# Patient Record
Sex: MALE | Race: WHITE | URBAN - METROPOLITAN AREA
[De-identification: names, ages, dates, MRNs, and addresses within clinical notes are randomized per-mention and may not be internally consistent; named-entity substitution may affect disease eponyms.]

---

## 2024-03-28 ENCOUNTER — LAB (OUTPATIENT)
Dept: URBAN - METROPOLITAN AREA CLINIC 74 | Facility: CLINIC | Age: 44
End: 2024-03-28

## 2024-03-28 ENCOUNTER — OV NP (OUTPATIENT)
Dept: URBAN - METROPOLITAN AREA CLINIC 74 | Facility: CLINIC | Age: 44
End: 2024-03-28
Payer: COMMERCIAL

## 2024-03-28 VITALS
BODY MASS INDEX: 29.9 KG/M2 | WEIGHT: 213.6 LBS | DIASTOLIC BLOOD PRESSURE: 98 MMHG | SYSTOLIC BLOOD PRESSURE: 140 MMHG | HEIGHT: 71 IN | TEMPERATURE: 97.5 F | HEART RATE: 96 BPM

## 2024-03-28 DIAGNOSIS — D75.1 SECONDARY POLYCYTHEMIA: ICD-10-CM

## 2024-03-28 DIAGNOSIS — R10.821 RIGHT UPPER QUADRANT ABDOMINAL TENDERNESS WITH REBOUND TENDERNESS: ICD-10-CM

## 2024-03-28 DIAGNOSIS — Z79.1 LONG TERM CURRENT USE OF NON-STEROIDAL ANTI-INFLAMMATORIES (NSAID): ICD-10-CM

## 2024-03-28 DIAGNOSIS — R10.13 EPIGASTRIC ABDOMINAL PAIN: ICD-10-CM

## 2024-03-28 DIAGNOSIS — R11.0 NAUSEA: ICD-10-CM

## 2024-03-28 DIAGNOSIS — R10.826 EPIGASTRIC ABDOMINAL TENDERNESS WITH REBOUND TENDERNESS: ICD-10-CM

## 2024-03-28 PROCEDURE — 99204 OFFICE O/P NEW MOD 45 MIN: CPT | Performed by: PHYSICIAN ASSISTANT

## 2024-03-28 RX ORDER — TOPIRAMATE 50 MG/1
1 TABLET TABLET, FILM COATED ORAL ONCE A DAY
Status: ACTIVE | COMMUNITY

## 2024-03-28 RX ORDER — PANTOPRAZOLE SODIUM 40 MG/1
1 TABLET TABLET, DELAYED RELEASE ORAL TWICE DAILY
Qty: 60 | Refills: 3 | OUTPATIENT
Start: 2024-03-28

## 2024-03-28 RX ORDER — DIHYDROERGOTAMINE MESYLATE 4 MG/ML
1 SPRAY IN EACH NOSTRIL MAY REPEAT DOSE AFTER 1 HOUR NO MORE THAN 3 DOSES PER WEEK AS NEEDED SPRAY, METERED NASAL ONCE A DAY
Status: ACTIVE | COMMUNITY

## 2024-03-28 NOTE — HPI-TODAY'S VISIT:
The patient is 44-year-old male with past medical history as noted below is presenting to our clinic today with epigastric abdominal pain. The patient with known history of secondary polycythemia follow with hematology Dr. Willis last was seen in their office on 01/30/2024.  Extended workup with hematocrit at 56.8% and hemoglobin 18.6.  The patient has had extended labs, which revealed elevated hematocrit on January 2021 of 55%, that increased to 60.8 % in March. The patient that JAK2 V617F cascading reflex panel was negative for any mutations. Hereditary Hemochromatosis mutation was also not detected. He has been feeling better since he had phlebotomy. Today he reports he has been  following with Pulmonology and has completed sleep study and started on CPAP for ALISON.   -- Polycythemia: suspected secondary to possible sleep apnea.  JAK2 V617F cascading reflex panel was negative for any mutations. Hereditary Hemochromatosis mutation was also not detected.  He underwent therapeutic phlebotomy with correction of his hematocrit from the highest of 56 most recently  to 44.  Repeat blood work today shows hematocrit of 47.5.  Will monitor CBC every 8 weeks perform phlebotomy as indicated.  -- Elevated iron/iron concentration: Hereditary hemochromatosis gene mutation not detected. Iron studies and ferritin have now normalized.   -- Sleep apnea:  He is now on CPAP and will continue to follow-up with pulmonology.   The patient is here today with epigastric abdominal pain shortly after he was on a course of Motrin for migraine headche. He also ahs bee treated twice with Antibiotic therapy Z-pack and Augmentin for infections. He does have dyspepsia b ut he does not taking any medications except occsional over the counter medications. He is now see Neurologist and he is on appropriate medication. He states that his abdoominal pain is associated with nause and increase pain after meals. No changes in his bowel habits.     -- The patient denies dyspepsia, dysphagia, odynophagia, hemoptysis, hematemesis, vomiting, regurgitation, melena, constipation, diarrhea, hematochezia, fever, chills, chest pain, SOB, or any other GI complaints today.   -- The patient denies ETOH (socially), Tobacco, and Illicit drug use.   -- The patient is not up to date with Flu and COVID vaccine.  -- Admits to take NSAID's daily.

## 2024-04-08 ENCOUNTER — EGD (OUTPATIENT)
Dept: URBAN - METROPOLITAN AREA SURGERY CENTER 30 | Facility: SURGERY CENTER | Age: 44
End: 2024-04-08
Payer: COMMERCIAL

## 2024-04-08 ENCOUNTER — LAB (OUTPATIENT)
Dept: URBAN - METROPOLITAN AREA CLINIC 4 | Facility: CLINIC | Age: 44
End: 2024-04-08
Payer: COMMERCIAL

## 2024-04-08 DIAGNOSIS — R10.13 ABDOMINAL DISCOMFORT, EPIGASTRIC: ICD-10-CM

## 2024-04-08 DIAGNOSIS — K21.9 ACID REFLUX: ICD-10-CM

## 2024-04-08 DIAGNOSIS — R11.0 NAUSEA: ICD-10-CM

## 2024-04-08 DIAGNOSIS — K31.89 OTHER DISEASES OF STOMACH AND DUODENUM: ICD-10-CM

## 2024-04-08 DIAGNOSIS — R10.11 ABDOMINAL BURNING SENSATION IN RIGHT UPPER QUADRANT: ICD-10-CM

## 2024-04-08 DIAGNOSIS — K21.9 GASTRO-ESOPHAGEAL REFLUX DISEASE WITHOUT ESOPHAGITIS: ICD-10-CM

## 2024-04-08 PROCEDURE — 88305 TISSUE EXAM BY PATHOLOGIST: CPT | Performed by: PATHOLOGY

## 2024-04-08 PROCEDURE — 88312 SPECIAL STAINS GROUP 1: CPT | Performed by: PATHOLOGY

## 2024-04-08 PROCEDURE — 43239 EGD BIOPSY SINGLE/MULTIPLE: CPT | Performed by: INTERNAL MEDICINE

## 2024-04-08 RX ORDER — PANTOPRAZOLE SODIUM 40 MG/1
1 TABLET TABLET, DELAYED RELEASE ORAL TWICE DAILY
Qty: 60 | Refills: 3 | Status: ACTIVE | COMMUNITY
Start: 2024-03-28

## 2024-04-08 RX ORDER — DIHYDROERGOTAMINE MESYLATE 4 MG/ML
1 SPRAY IN EACH NOSTRIL MAY REPEAT DOSE AFTER 1 HOUR NO MORE THAN 3 DOSES PER WEEK AS NEEDED SPRAY, METERED NASAL ONCE A DAY
Status: ACTIVE | COMMUNITY

## 2024-04-08 RX ORDER — TOPIRAMATE 50 MG/1
1 TABLET TABLET, FILM COATED ORAL ONCE A DAY
Status: ACTIVE | COMMUNITY

## 2024-04-24 ENCOUNTER — OV EP (OUTPATIENT)
Dept: URBAN - METROPOLITAN AREA CLINIC 74 | Facility: CLINIC | Age: 44
End: 2024-04-24
Payer: COMMERCIAL

## 2024-04-24 ENCOUNTER — LAB (OUTPATIENT)
Dept: URBAN - METROPOLITAN AREA CLINIC 74 | Facility: CLINIC | Age: 44
End: 2024-04-24

## 2024-04-24 VITALS
HEIGHT: 71 IN | BODY MASS INDEX: 30.24 KG/M2 | TEMPERATURE: 97.7 F | SYSTOLIC BLOOD PRESSURE: 124 MMHG | OXYGEN SATURATION: 98 % | HEART RATE: 95 BPM | WEIGHT: 216 LBS | DIASTOLIC BLOOD PRESSURE: 68 MMHG

## 2024-04-24 DIAGNOSIS — R10.12 LUQ ABDOMINAL PAIN: ICD-10-CM

## 2024-04-24 DIAGNOSIS — K29.30 CHRONIC SUPERFICIAL GASTRITIS WITHOUT BLEEDING: ICD-10-CM

## 2024-04-24 DIAGNOSIS — N20.0 NEPHROLITHIASIS: ICD-10-CM

## 2024-04-24 DIAGNOSIS — R07.89 COSTOCHONDRAL PAIN: ICD-10-CM

## 2024-04-24 DIAGNOSIS — D75.1 SECONDARY POLYCYTHEMIA: ICD-10-CM

## 2024-04-24 DIAGNOSIS — R10.9 LEFT FLANK PAIN: ICD-10-CM

## 2024-04-24 DIAGNOSIS — Z79.1 LONG TERM CURRENT USE OF NON-STEROIDAL ANTI-INFLAMMATORIES (NSAID): ICD-10-CM

## 2024-04-24 PROBLEM — 95570007: Status: ACTIVE | Noted: 2024-04-24

## 2024-04-24 PROBLEM — 196735001: Status: ACTIVE | Noted: 2024-04-24

## 2024-04-24 PROCEDURE — 99214 OFFICE O/P EST MOD 30 MIN: CPT | Performed by: PHYSICIAN ASSISTANT

## 2024-04-24 RX ORDER — TOPIRAMATE 50 MG/1
1 TABLET TABLET, FILM COATED ORAL ONCE A DAY
Status: ON HOLD | COMMUNITY

## 2024-04-24 RX ORDER — DICYCLOMINE HYDROCHLORIDE 20 MG/1
1 TABLET TABLET ORAL THREE TIMES A DAY
Qty: 90 | Refills: 1 | OUTPATIENT
Start: 2024-04-24 | End: 2024-06-23

## 2024-04-24 RX ORDER — PANTOPRAZOLE SODIUM 40 MG/1
1 TABLET TABLET, DELAYED RELEASE ORAL
Qty: 180 | Refills: 1
Start: 2024-04-08

## 2024-04-24 RX ORDER — PANTOPRAZOLE SODIUM 40 MG/1
1 TABLET TABLET, DELAYED RELEASE ORAL
Qty: 180 | Refills: 1 | Status: ON HOLD | COMMUNITY
Start: 2024-04-08

## 2024-04-24 RX ORDER — SUCRALFATE 1 G/1
1 TABLET ON AN EMPTY STOMACH TABLET ORAL TWICE A DAY
Qty: 60 | Refills: 1 | Status: ON HOLD | COMMUNITY
Start: 2024-04-08 | End: 2024-06-07

## 2024-04-24 RX ORDER — DIHYDROERGOTAMINE MESYLATE 4 MG/ML
1 SPRAY IN EACH NOSTRIL MAY REPEAT DOSE AFTER 1 HOUR NO MORE THAN 3 DOSES PER WEEK AS NEEDED SPRAY, METERED NASAL ONCE A DAY
Status: ON HOLD | COMMUNITY

## 2024-04-24 NOTE — PHYSICAL EXAM CHEST:
chest wall non-tender, breathing is unlabored without accessory muscle use, normal breath sounds, tender at left lower ribs

## 2024-04-24 NOTE — PHYSICAL EXAM GASTROINTESTINAL
Abdomen , soft, nontender, nondistended , no guarding or rigidity , no masses palpable , normal bowel sounds , Liver and Spleen,  no hepatosplenomegaly , liver nontender, CVA tenderness on the left side

## 2024-04-24 NOTE — HPI-TODAY'S VISIT:
The patient is 44-year-old male with past medical history as noted below is presenting to our clinic today to discuss his EGD and US results.  The patient is not taking Pantoprazole 40 mg and Sucralfate 1g. The patient is complaining of left upper back and lower back pain with no chnages in his urination or bowel movements. Increase pain with movement. Pain is associated with nausea. LUQ and left flank pain consistenet since last week. He has known history of kidney stone.    -- The patient with known history of secondary polycythemia follow with hematology Dr. Alba pascual was seen in their office on 01/30/2024.  Extended workup with hematocrit at 56.8% and hemoglobin 18.6.  The patient has had extended labs, which revealed elevated hematocrit on January 2021 of 55%, that increased to 60.8 % in March. The patient that JAK2 V617F cascading reflex panel was negative for any mutations. Hereditary Hemochromatosis mutation was also not detected. He has been feeling better since he had phlebotomy. Today he reports he has been  following with Pulmonology and has completed sleep study and started on CPAP for ALISON.   -- Polycythemia: suspected secondary to possible sleep apnea.  JAK2 V617F cascading reflex panel was negative for any mutations. Hereditary Hemochromatosis mutation was also not detected.  He underwent therapeutic phlebotomy with correction of his hematocrit from the highest of 56 most recently  to 44.  Repeat blood work today shows hematocrit of 47.5.  Will monitor CBC every 8 weeks perform phlebotomy as indicated.  -- Elevated Fe/Fe concentration: Hereditary hemochromatosis gene mutation not detected. Iron studies and ferritin have now normalized.   -- Sleep apnea:  He is now on CPAP and will continue to follow-up with pulmonary.     -- The patient denies dyspepsia, dysphagia, odynophagia, hemoptysis, hematemesis, vomiting, regurgitation, melena, constipation, diarrhea, hematochezia, fever, chills, chest pain, SOB, or any other GI complaints today.   -- The patient denies ETOH (socially), Tobacco, and Illicit drug use.   -- The patient is not up to date with Flu and COVID vaccine.  -- Admits to take NSAID's daily.   Diagnostic studies: -- RUQ US on 03/28/2024 with unremarakble results.   Procedure: -- EGD with biopsy on 0/08/2024 by Dr. Rivera noted Z-line variable, 39 cm from the incisors.  Gastritis.  No gross lesion entire examined duodenum.  Biopsy of small bowel with no significant abnormality.  Stomach with foveolar hyperplasia.  No H.pylori organisms.  No intestinal metaplasia.  Esophagus with reflux type changes.  No Hardwick's esophagus or eosinophilic esophagitis.

## 2024-05-06 ENCOUNTER — OFFICE VISIT (OUTPATIENT)
Dept: URBAN - METROPOLITAN AREA CLINIC 74 | Facility: CLINIC | Age: 44
End: 2024-05-06

## 2024-05-22 ENCOUNTER — OFFICE VISIT (OUTPATIENT)
Dept: URBAN - METROPOLITAN AREA CLINIC 74 | Facility: CLINIC | Age: 44
End: 2024-05-22

## 2024-10-22 ENCOUNTER — OFFICE VISIT (OUTPATIENT)
Dept: URBAN - METROPOLITAN AREA CLINIC 74 | Facility: CLINIC | Age: 44
End: 2024-10-22
Payer: COMMERCIAL

## 2024-10-22 ENCOUNTER — DASHBOARD ENCOUNTERS (OUTPATIENT)
Age: 44
End: 2024-10-22

## 2024-10-22 ENCOUNTER — LAB OUTSIDE AN ENCOUNTER (OUTPATIENT)
Dept: URBAN - METROPOLITAN AREA CLINIC 74 | Facility: CLINIC | Age: 44
End: 2024-10-22

## 2024-10-22 VITALS
HEIGHT: 71 IN | TEMPERATURE: 97.7 F | BODY MASS INDEX: 29.82 KG/M2 | WEIGHT: 213 LBS | HEART RATE: 82 BPM | SYSTOLIC BLOOD PRESSURE: 108 MMHG | OXYGEN SATURATION: 97 % | DIASTOLIC BLOOD PRESSURE: 74 MMHG

## 2024-10-22 DIAGNOSIS — M79.18 MUSCULOSKELETAL PAIN: ICD-10-CM

## 2024-10-22 DIAGNOSIS — K42.9 UMBILICAL HERNIA WITHOUT OBSTRUCTION AND WITHOUT GANGRENE: ICD-10-CM

## 2024-10-22 DIAGNOSIS — R19.4 CHANGE IN BOWEL HABITS: ICD-10-CM

## 2024-10-22 DIAGNOSIS — K29.30 CHRONIC SUPERFICIAL GASTRITIS WITHOUT BLEEDING: ICD-10-CM

## 2024-10-22 DIAGNOSIS — M94.0 COSTOCHONDRITIS: ICD-10-CM

## 2024-10-22 DIAGNOSIS — R10.30 LOWER ABDOMINAL PAIN: ICD-10-CM

## 2024-10-22 PROCEDURE — 99214 OFFICE O/P EST MOD 30 MIN: CPT | Performed by: PHYSICIAN ASSISTANT

## 2024-10-22 RX ORDER — PANTOPRAZOLE SODIUM 40 MG/1
1 TABLET TABLET, DELAYED RELEASE ORAL
Qty: 180 | Refills: 1 | Status: ACTIVE | COMMUNITY
Start: 2024-04-08

## 2024-10-22 RX ORDER — DIHYDROERGOTAMINE MESYLATE 4 MG/ML
1 SPRAY IN EACH NOSTRIL MAY REPEAT DOSE AFTER 1 HOUR NO MORE THAN 3 DOSES PER WEEK AS NEEDED SPRAY, METERED NASAL ONCE A DAY
Status: ON HOLD | COMMUNITY

## 2024-10-22 RX ORDER — TOPIRAMATE 50 MG/1
1 TABLET TABLET, FILM COATED ORAL ONCE A DAY
Status: ON HOLD | COMMUNITY

## 2024-10-22 RX ORDER — PANTOPRAZOLE SODIUM 40 MG/1
1 TABLET TABLET, DELAYED RELEASE ORAL ONCE A DAY
Qty: 90 TABLET | Refills: 1
Start: 2024-04-08

## 2024-10-22 RX ORDER — DICYCLOMINE HYDROCHLORIDE 20 MG/1
1 TABLET TABLET ORAL THREE TIMES A DAY
Qty: 90 | Refills: 3
Start: 2024-04-24 | End: 2025-02-18

## 2024-10-22 NOTE — HPI-TODAY'S VISIT:
The patient is 44-year-old male with past medical history as noted below is presenting to our clinic today to discuss his recent CT results.  The patient is not taking Pantoprazole 40 mg once daily and Dicylomine 20 mg one tablet every 8 hours. He complains of epigastric and bilateral lower flank pain. He states that he forgets to take his medications. The patient wants to schedule Colonoscopy due to persistent pain and normal CT scan. No other GI issues today.   - - - - - - - - - - - - - - - - - - - - - - - - - - - - DIANA: -- The patient with known history of secondary polycythemia follow with hematology Dr. Alba pascual was seen in their office on 07/31/2024. Extended workup with hematocrit at 56.8% and hemoglobin 18.6.  The patient has had extended labs, which revealed elevated hematocrit on January 2021 of 55%, that increased to 60.8 % in March. The patient that JAK2 V617F cascading reflex panel was negative for any mutations. Hereditary Hemochromatosis mutation was also not detected.   -- Polycythemia: suspected secondary to possible sleep apnea.  JAK2 V617F cascading reflex panel was negative for any mutations. Hereditary Hemochromatosis mutation was also not detected.  He underwent therapeutic phlebotomy with correction of his hematocrit from the highest of 56 most recently  to 44. On therapeutic phlebotomy as indicated with a goal of hematocrit less than 48.  -- Elevated Fe/Fe concentration: Hereditary hemochromatosis gene mutation not detected. Iron studies and ferritin have now normalized.   -- Sleep apnea:  He is now on CPAP and will continue to follow-up with pulmonary.    Diagnostic studies: -- Labs on 09/25/2024 CBC with WBC 5.4, hemoglobin 14.2, hematocrit 46.4, and platelet 212.  Panel Fe with Fe 64, TIBC 449, % saturation 14, and Ferritin 14.  CMP with BUN 12, creatinine 1.39, ALP 50, AST 22, ALT 27, and TB 1.0.  -- CT scan of abdomen with IV contrast on 09/13/2024 with no acute abnormality.  Fat-containing umbilical hernia.  Liver, gallbladder, pancreas, spleen, adrenal glands, and kidneys are unremarkable.  -- RUQ US on 03/28/2024 with unremarakble results.   Procedure: -- EGD with biopsy on 04/08/2024 by Dr. Rivera noted Z-line variable, 39 cm from the incisors.  Gastritis.  No gross lesion entire examined duodenum.  Biopsy of small bowel with no significant abnormality.  Stomach with foveolar hyperplasia.  No H.pylori organisms.  No intestinal metaplasia.  Esophagus with reflux type changes.  No Hardwick's esophagus or eosinophilic esophagitis.

## 2024-10-22 NOTE — PHYSICAL EXAM CHEST:
chest wall non-tender, breathing is unlabored without accessory muscle use, normal breath sounds, tender upon palpation of left lower ribs

## 2024-10-25 ENCOUNTER — WEB ENCOUNTER (OUTPATIENT)
Dept: URBAN - METROPOLITAN AREA CLINIC 74 | Facility: CLINIC | Age: 44
End: 2024-10-25

## 2024-11-14 ENCOUNTER — CLAIMS CREATED FROM THE CLAIM WINDOW (OUTPATIENT)
Dept: URBAN - METROPOLITAN AREA SURGERY CENTER 30 | Facility: SURGERY CENTER | Age: 44
End: 2024-11-14
Payer: COMMERCIAL

## 2024-11-14 ENCOUNTER — WEB ENCOUNTER (OUTPATIENT)
Dept: URBAN - METROPOLITAN AREA CLINIC 74 | Facility: CLINIC | Age: 44
End: 2024-11-14

## 2024-11-14 ENCOUNTER — CLAIMS CREATED FROM THE CLAIM WINDOW (OUTPATIENT)
Dept: URBAN - METROPOLITAN AREA CLINIC 4 | Facility: CLINIC | Age: 44
End: 2024-11-14
Payer: COMMERCIAL

## 2024-11-14 DIAGNOSIS — K50.00 CROHN''S DISEASE OF SMALL INTESTINE WITHOUT COMPLICATION: ICD-10-CM

## 2024-11-14 DIAGNOSIS — D12.0 BENIGN NEOPLASM OF CECUM: ICD-10-CM

## 2024-11-14 DIAGNOSIS — D12.5 ADENOMA OF SIGMOID COLON: ICD-10-CM

## 2024-11-14 DIAGNOSIS — D12.5 BENIGN NEOPLASM OF SIGMOID COLON: ICD-10-CM

## 2024-11-14 DIAGNOSIS — K63.3 ULCER OF INTESTINE: ICD-10-CM

## 2024-11-14 DIAGNOSIS — K52.89 OTHER SPECIFIED NONINFECTIVE GASTROENTERITIS AND COLITIS: ICD-10-CM

## 2024-11-14 DIAGNOSIS — R10.32 ABDOMINAL PAIN, LEFT LOWER QUADRANT: ICD-10-CM

## 2024-11-14 DIAGNOSIS — R19.4 CHANGE IN BOWEL HABIT,: ICD-10-CM

## 2024-11-14 DIAGNOSIS — D12.0 ADENOMATOUS POLYP OF CECUM: ICD-10-CM

## 2024-11-14 DIAGNOSIS — K64.8 OTHER HEMORRHOIDS: ICD-10-CM

## 2024-11-14 PROCEDURE — 45385 COLONOSCOPY W/LESION REMOVAL: CPT | Performed by: INTERNAL MEDICINE

## 2024-11-14 PROCEDURE — 45380 COLONOSCOPY AND BIOPSY: CPT | Performed by: INTERNAL MEDICINE

## 2024-11-14 PROCEDURE — 88341 IMHCHEM/IMCYTCHM EA ADD ANTB: CPT | Performed by: PATHOLOGY

## 2024-11-14 PROCEDURE — 88342 IMHCHEM/IMCYTCHM 1ST ANTB: CPT | Performed by: PATHOLOGY

## 2024-11-14 PROCEDURE — 88305 TISSUE EXAM BY PATHOLOGIST: CPT | Performed by: PATHOLOGY

## 2024-11-14 PROCEDURE — 00811 ANES LWR INTST NDSC NOS: CPT | Performed by: NURSE ANESTHETIST, CERTIFIED REGISTERED

## 2024-11-27 ENCOUNTER — TELEPHONE ENCOUNTER (OUTPATIENT)
Dept: URBAN - METROPOLITAN AREA CLINIC 40 | Facility: CLINIC | Age: 44
End: 2024-11-27

## 2024-12-04 PROBLEM — 38106008: Status: ACTIVE | Noted: 2024-12-04

## 2024-12-06 ENCOUNTER — TELEPHONE ENCOUNTER (OUTPATIENT)
Dept: URBAN - METROPOLITAN AREA CLINIC 74 | Facility: CLINIC | Age: 44
End: 2024-12-06

## 2024-12-06 ENCOUNTER — OFFICE VISIT (OUTPATIENT)
Dept: URBAN - METROPOLITAN AREA CLINIC 74 | Facility: CLINIC | Age: 44
End: 2024-12-06

## 2024-12-06 RX ORDER — DICYCLOMINE HYDROCHLORIDE 20 MG/1
1 TABLET TABLET ORAL THREE TIMES A DAY
Qty: 90 | Refills: 3 | COMMUNITY
Start: 2024-04-24 | End: 2025-02-18

## 2024-12-06 RX ORDER — DIHYDROERGOTAMINE MESYLATE 4 MG/ML
1 SPRAY IN EACH NOSTRIL MAY REPEAT DOSE AFTER 1 HOUR NO MORE THAN 3 DOSES PER WEEK AS NEEDED SPRAY, METERED NASAL ONCE A DAY
COMMUNITY

## 2024-12-06 RX ORDER — PANTOPRAZOLE SODIUM 40 MG/1
1 TABLET TABLET, DELAYED RELEASE ORAL ONCE A DAY
Qty: 90 TABLET | Refills: 1 | COMMUNITY
Start: 2024-04-08

## 2024-12-06 RX ORDER — TOPIRAMATE 50 MG/1
1 TABLET TABLET, FILM COATED ORAL ONCE A DAY
COMMUNITY

## 2024-12-06 NOTE — HPI-TODAY'S VISIT:
The patient is 44-year-old male with past medical history as noted below known to Dr. Rivera is presenting to our clinic today to discuss his recent Colonoscopy results. The patient is not taking Pantoprazole 40 mg once daily and Dicylomine 20 mg one tablet every 8 hours.   - - - - - - - - - - - - - - - - - - - - - - - - - - - - DIANA: -- The patient with known history of secondary polycythemia follow with hematology Dr. Willis last was seen in their office on 07/31/2024. Extended workup with hematocrit at 56.8% and hemoglobin 18.6.  The patient has had extended labs, which revealed elevated hematocrit on January 2021 of 55%, that increased to 60.8 % in March. The patient that JAK2 V617F cascading reflex panel was negative for any mutations. Hereditary Hemochromatosis mutation was also not detected.   -- Polycythemia: suspected secondary to possible sleep apnea.  JAK2 V617F cascading reflex panel was negative for any mutations. Hereditary Hemochromatosis mutation was also not detected.  He underwent therapeutic phlebotomy with correction of his hematocrit from the highest of 56 most recently  to 44. On therapeutic phlebotomy as indicated with a goal of hematocrit less than 48.  -- Elevated Fe/Fe concentration: Hereditary hemochromatosis gene mutation not detected. Iron studies and ferritin have now normalized.   -- Sleep apnea:  He is now on CPAP and will continue to follow-up with pulmonary.    Diagnostic studies: -- Labs on 09/25/2024 CBC with WBC 5.4, hemoglobin 14.2, hematocrit 46.4, and platelet 212.  Panel Fe with Fe 64, TIBC 449, % saturation 14, and Ferritin 14.  CMP with BUN 12, creatinine 1.39, ALP 50, AST 22, ALT 27, and TB 1.0.  -- CT scan of abdomen with IV contrast on 09/13/2024 with no acute abnormality.  Fat-containing umbilical hernia.  Liver, gallbladder, pancreas, spleen, adrenal glands, and kidneys are unremarkable.  -- RUQ US on 03/28/2024 with unremarakble results.   Procedures: -- Colonoscopy with polypectomy on 11/14/2024 by Dr. Rivera noted moderate inflammation was found in the ileum. One 9 mm polyp in the cecum, removed with a cold snare.  Resected and retrieved. Two 10-12 millimeter polyps in the sigmoid colon, removed with hot snare.  Resected and retrieved.  Internal hemorrhoids.  Biopsy tubular adenoma and tubulovillous adenoma colon polyps.  Patchy chronic ileitis with focal acute cryptitis, aphthous ulcer, and crypt distortion, suggestive of Crohn's disease.  Repeat colonoscopy in 3 years for surveillance.  -- EGD with biopsy on 04/08/2024 by Dr. Rivera noted Z-line variable, 39 cm from the incisors.  Gastritis.  No gross lesion entire examined duodenum.  Biopsy of small bowel with no significant abnormality.  Stomach with foveolar hyperplasia.  No H.pylori organisms.  No intestinal metaplasia.  Esophagus with reflux type changes.  No Hardwick's esophagus or eosinophilic esophagitis.

## 2024-12-12 ENCOUNTER — OFFICE VISIT (OUTPATIENT)
Dept: URBAN - METROPOLITAN AREA CLINIC 74 | Facility: CLINIC | Age: 44
End: 2024-12-12

## 2025-02-21 ENCOUNTER — OFFICE VISIT (OUTPATIENT)
Dept: URBAN - METROPOLITAN AREA CLINIC 74 | Facility: CLINIC | Age: 45
End: 2025-02-21
Payer: COMMERCIAL

## 2025-02-21 ENCOUNTER — LAB OUTSIDE AN ENCOUNTER (OUTPATIENT)
Dept: URBAN - METROPOLITAN AREA CLINIC 74 | Facility: CLINIC | Age: 45
End: 2025-02-21

## 2025-02-21 VITALS
HEIGHT: 69 IN | WEIGHT: 216.6 LBS | HEART RATE: 100 BPM | TEMPERATURE: 98.8 F | DIASTOLIC BLOOD PRESSURE: 88 MMHG | SYSTOLIC BLOOD PRESSURE: 148 MMHG | BODY MASS INDEX: 32.08 KG/M2

## 2025-02-21 DIAGNOSIS — K44.9 HIATAL HERNIA: ICD-10-CM

## 2025-02-21 DIAGNOSIS — K64.8 INTERNAL HEMORRHOIDS: ICD-10-CM

## 2025-02-21 DIAGNOSIS — R13.19 ESOPHAGEAL DYSPHAGIA: ICD-10-CM

## 2025-02-21 DIAGNOSIS — K50.00 CROHN'S DISEASE OF ILEUM WITHOUT COMPLICATION: ICD-10-CM

## 2025-02-21 DIAGNOSIS — K20.90 ESOPHAGITIS: ICD-10-CM

## 2025-02-21 DIAGNOSIS — K42.9 UMBILICAL HERNIA WITHOUT OBSTRUCTION AND WITHOUT GANGRENE: ICD-10-CM

## 2025-02-21 DIAGNOSIS — K29.30 CHRONIC SUPERFICIAL GASTRITIS WITHOUT BLEEDING: ICD-10-CM

## 2025-02-21 DIAGNOSIS — Z86.0101 HISTORY OF ADENOMATOUS POLYP OF COLON: ICD-10-CM

## 2025-02-21 PROCEDURE — 99215 OFFICE O/P EST HI 40 MIN: CPT | Performed by: PHYSICIAN ASSISTANT

## 2025-02-21 RX ORDER — PANTOPRAZOLE SODIUM 40 MG/1
1 TABLET TABLET, DELAYED RELEASE ORAL ONCE A DAY
Qty: 90 TABLET | Refills: 1 | COMMUNITY
Start: 2024-04-08

## 2025-02-21 RX ORDER — DIHYDROERGOTAMINE MESYLATE 4 MG/ML
1 SPRAY IN EACH NOSTRIL MAY REPEAT DOSE AFTER 1 HOUR NO MORE THAN 3 DOSES PER WEEK AS NEEDED SPRAY, METERED NASAL ONCE A DAY
COMMUNITY

## 2025-02-21 RX ORDER — HYDROCORTISONE SODIUM SUCCINATE 100 MG/2ML
AS DIRECTED INJECTION, POWDER, FOR SOLUTION INTRAMUSCULAR; INTRAVENOUS
OUTPATIENT
Start: 2025-02-21

## 2025-02-21 RX ORDER — ONDANSETRON 2 MG/ML
AS DIRECTED INJECTION, SOLUTION INTRAMUSCULAR; INTRAVENOUS
OUTPATIENT
Start: 2025-02-21

## 2025-02-21 RX ORDER — VEDOLIZUMAB 300 MG/5ML
AS DIRECTED INJECTION, POWDER, LYOPHILIZED, FOR SOLUTION INTRAVENOUS
OUTPATIENT
Start: 2025-02-21

## 2025-02-21 RX ORDER — ACETAMINOPHEN 650 MG
2 TABLETS AS NEEDED TABLET, EXTENDED RELEASE ORAL
OUTPATIENT
Start: 2025-02-21

## 2025-02-21 RX ORDER — TOPIRAMATE 50 MG/1
1 TABLET TABLET, FILM COATED ORAL ONCE A DAY
COMMUNITY

## 2025-02-21 RX ORDER — DIPHENHYDRAMINE HCL 2 %
1 CAPSULE AT BEDTIME AS NEEDED CREAM (GRAM) TOPICAL ONCE A DAY
Qty: 30 | OUTPATIENT
Start: 2025-02-21 | End: 2025-03-23

## 2025-02-21 NOTE — HPI-TODAY'S VISIT:
The patient is 44-year-old male with past medical history as noted below known to Dr. Rivera is presenting to our clinic today to discuss his recent Colonoscopy results. The patient is not taking Pantoprazole 40 mg once daily and Dicylomine 20 mg one tablet every 8 hours. He is also here to discuss his recent cardiac imaging with hiatal hernia and esophagitis. He has occasional difficulty swallowing. Normal bowel movements without rectal bleeding. Complains of joint pain in hands and rest. No Fever or chillls. No nocturnal bowel movements.   Diagnostic studies: -- Labs on 01/15/2025 CBC with WBC 4.9, hemoglobin 14.3, hematocrit 45.9, and platelet 211.  CMP with sodium 140, potassium 3.9, BUN 24, creatinine 1.12, ALP 64, AST 32, and ALT 56. Fe panel with Fe 85, TIBC 287, % saturation 22, and Ferritin 61.  Vitamin B12 637.  Folic acid 11.4.  -- CT scan of  Heart on 01/27/2025 with small hiatal hernia with distal esophageal wall thickening. The imaging findings are nonspecific but can be seen with underlying esophagitis. Follow-up is suggested with endoscopy to exclude the possibility of a more aggressive process.  -- HIDA scan on 11/11/2024 with no scintigraphic evidence of delayed gallbladder emptying.   -- Labs on 09/25/2024 CBC with WBC 5.4, hemoglobin 14.2, hematocrit 46.4, and platelet 212.  Panel Fe with Fe 64, TIBC 449, % saturation 14, and Ferritin 14.  CMP with BUN 12, creatinine 1.39, ALP 50, AST 22, ALT 27, and TB 1.0.  -- CT scan of abdomen with IV contrast on 09/13/2024 with no acute abnormality.  Fat-containing umbilical hernia.  Liver, gallbladder, pancreas, spleen, adrenal glands, and kidneys are unremarkable.  -- RUQ US on 03/28/2024 with unremarakble results.   Procedures: -- Colonoscopy with polypectomy on 11/14/2024 by Dr. Rivera noted moderate inflammation was found in the ileum. One 9 mm polyp in the cecum, removed with a cold snare.  Resected and retrieved. Two 10-12 millimeter polyps in the sigmoid colon, removed with hot snare.  Resected and retrieved.  Internal hemorrhoids.  Biopsy tubular adenoma and tubulovillous adenoma colon polyps.  Patchy chronic ileitis with focal acute cryptitis, aphthous ulcer, and crypt distortion, suggestive of Crohn's disease.  Repeat colonoscopy in 3 years for surveillance.  -- EGD with biopsy on 04/08/2024 by Dr. Rivera noted Z-line variable, 39 cm from the incisors.  Gastritis.  No gross lesion entire examined duodenum.  Biopsy of small bowel with no significant abnormality.  Stomach with foveolar hyperplasia.  No H.pylori organisms.  No intestinal metaplasia.  Esophagus with reflux type changes.  No Hardwick's esophagus or eosinophilic esophagitis.

## 2025-02-24 ENCOUNTER — WEB ENCOUNTER (OUTPATIENT)
Dept: URBAN - METROPOLITAN AREA CLINIC 40 | Facility: CLINIC | Age: 45
End: 2025-02-24

## 2025-02-26 ENCOUNTER — TELEPHONE ENCOUNTER (OUTPATIENT)
Dept: URBAN - METROPOLITAN AREA CLINIC 74 | Facility: CLINIC | Age: 45
End: 2025-02-26

## 2025-02-26 LAB
BASO (ABSOLUTE): 0.1
BASOS: 1
C-REACTIVE PROTEIN, QUANT: 2
EOS (ABSOLUTE): 0.3
EOS: 4
HBSAG SCREEN: NEGATIVE
HCV AB: NON REACTIVE
HEMATOCRIT: 48
HEMATOLOGY COMMENTS:: (no result)
HEMOGLOBIN: 14.3
HEP A AB, IGM: NEGATIVE
HEP B CORE AB, IGM: NEGATIVE
IMMATURE CELLS: (no result)
IMMATURE GRANS (ABS): 0
IMMATURE GRANULOCYTES: 0
INTERPRETATION:: (no result)
LYMPHS (ABSOLUTE): 1.2
LYMPHS: 18
MCH: 26.2
MCHC: 29.8
MCV: 88
MONOCYTES(ABSOLUTE): 0.7
MONOCYTES: 10
NEUTROPHILS (ABSOLUTE): 4.6
NEUTROPHILS: 67
NRBC: (no result)
PLATELETS: 298
QUANTIFERON CRITERIA: (no result)
QUANTIFERON INCUBATION: (no result)
QUANTIFERON MITOGEN VALUE: 6.67
QUANTIFERON NIL VALUE: 0.01
QUANTIFERON TB1 AG VALUE: 0.01
QUANTIFERON TB2 AG VALUE: 0.01
QUANTIFERON-TB GOLD PLUS: NEGATIVE
RBC: 5.46
RDW: 16.4
WBC: 6.9

## 2025-02-27 ENCOUNTER — OFFICE VISIT (OUTPATIENT)
Dept: URBAN - METROPOLITAN AREA SURGERY CENTER 30 | Facility: SURGERY CENTER | Age: 45
End: 2025-02-27

## 2025-02-27 RX ORDER — HYDROCORTISONE SODIUM SUCCINATE 100 MG/2ML
AS DIRECTED INJECTION, POWDER, FOR SOLUTION INTRAMUSCULAR; INTRAVENOUS
Status: ACTIVE | COMMUNITY
Start: 2025-02-21

## 2025-02-27 RX ORDER — ONDANSETRON 2 MG/ML
AS DIRECTED INJECTION, SOLUTION INTRAMUSCULAR; INTRAVENOUS
Status: ACTIVE | COMMUNITY
Start: 2025-02-21

## 2025-02-27 RX ORDER — VEDOLIZUMAB 300 MG/5ML
AS DIRECTED INJECTION, POWDER, LYOPHILIZED, FOR SOLUTION INTRAVENOUS
Status: ACTIVE | COMMUNITY
Start: 2025-02-21

## 2025-02-27 RX ORDER — TOPIRAMATE 50 MG/1
1 TABLET TABLET, FILM COATED ORAL ONCE A DAY
COMMUNITY

## 2025-02-27 RX ORDER — DIHYDROERGOTAMINE MESYLATE 4 MG/ML
1 SPRAY IN EACH NOSTRIL MAY REPEAT DOSE AFTER 1 HOUR NO MORE THAN 3 DOSES PER WEEK AS NEEDED SPRAY, METERED NASAL ONCE A DAY
COMMUNITY

## 2025-02-27 RX ORDER — PANTOPRAZOLE SODIUM 40 MG/1
1 TABLET TABLET, DELAYED RELEASE ORAL ONCE A DAY
Qty: 90 TABLET | Refills: 1 | COMMUNITY
Start: 2024-04-08

## 2025-02-27 RX ORDER — ACETAMINOPHEN 650 MG
2 TABLETS AS NEEDED TABLET, EXTENDED RELEASE ORAL
Status: ACTIVE | COMMUNITY
Start: 2025-02-21

## 2025-02-27 RX ORDER — DIPHENHYDRAMINE HCL 2 %
1 CAPSULE AT BEDTIME AS NEEDED CREAM (GRAM) TOPICAL ONCE A DAY
Qty: 30 | Status: ACTIVE | COMMUNITY
Start: 2025-02-21 | End: 2025-03-23

## 2025-03-03 ENCOUNTER — TELEPHONE ENCOUNTER (OUTPATIENT)
Dept: URBAN - METROPOLITAN AREA CLINIC 80 | Facility: CLINIC | Age: 45
End: 2025-03-03

## 2025-03-07 ENCOUNTER — OFFICE VISIT (OUTPATIENT)
Dept: URBAN - METROPOLITAN AREA SURGERY CENTER 30 | Facility: SURGERY CENTER | Age: 45
End: 2025-03-07

## 2025-03-09 PROBLEM — 8493009: Status: ACTIVE | Noted: 2025-03-09

## 2025-03-13 ENCOUNTER — OFFICE VISIT (OUTPATIENT)
Dept: URBAN - METROPOLITAN AREA CLINIC 74 | Facility: CLINIC | Age: 45
End: 2025-03-13
Payer: COMMERCIAL

## 2025-03-13 VITALS
OXYGEN SATURATION: 96 % | BODY MASS INDEX: 32.29 KG/M2 | WEIGHT: 218 LBS | HEIGHT: 69 IN | HEART RATE: 90 BPM | SYSTOLIC BLOOD PRESSURE: 134 MMHG | TEMPERATURE: 97.5 F | DIASTOLIC BLOOD PRESSURE: 82 MMHG

## 2025-03-13 DIAGNOSIS — K29.50 MILD CHRONIC GASTRITIS: ICD-10-CM

## 2025-03-13 DIAGNOSIS — K50.00 CROHN'S DISEASE OF ILEUM WITHOUT COMPLICATION: ICD-10-CM

## 2025-03-13 DIAGNOSIS — K20.90 ESOPHAGITIS: ICD-10-CM

## 2025-03-13 DIAGNOSIS — K22.89 ESOPHAGEAL DILATATION: ICD-10-CM

## 2025-03-13 PROCEDURE — 99214 OFFICE O/P EST MOD 30 MIN: CPT | Performed by: PHYSICIAN ASSISTANT

## 2025-03-13 RX ORDER — ACETAMINOPHEN 650 MG
2 TABLETS AS NEEDED TABLET, EXTENDED RELEASE ORAL
OUTPATIENT

## 2025-03-13 RX ORDER — DICYCLOMINE HYDROCHLORIDE 20 MG/1
1 TABLET TABLET ORAL THREE TIMES A DAY
Qty: 90 | Refills: 3
Start: 2024-04-24 | End: 2025-07-11

## 2025-03-13 RX ORDER — VEDOLIZUMAB 300 MG/5ML
AS DIRECTED INJECTION, POWDER, LYOPHILIZED, FOR SOLUTION INTRAVENOUS
OUTPATIENT

## 2025-03-13 RX ORDER — ONDANSETRON 2 MG/ML
AS DIRECTED INJECTION, SOLUTION INTRAMUSCULAR; INTRAVENOUS
Status: ACTIVE | COMMUNITY
Start: 2025-02-21

## 2025-03-13 RX ORDER — PANTOPRAZOLE SODIUM 40 MG/1
1 TABLET TABLET, DELAYED RELEASE ORAL ONCE A DAY
Qty: 90 TABLET | Refills: 1
Start: 2024-04-08

## 2025-03-13 RX ORDER — DIPHENHYDRAMINE HCL 2 %
1 CAPSULE AT BEDTIME AS NEEDED CREAM (GRAM) TOPICAL ONCE A DAY
Qty: 30 | Status: ACTIVE | COMMUNITY
Start: 2025-02-21 | End: 2025-03-23

## 2025-03-13 RX ORDER — DICYCLOMINE HYDROCHLORIDE 20 MG/1
1 TABLET TABLET ORAL THREE TIMES A DAY
Status: ACTIVE | COMMUNITY
Start: 2025-03-13

## 2025-03-13 RX ORDER — ONDANSETRON 2 MG/ML
AS DIRECTED INJECTION, SOLUTION INTRAMUSCULAR; INTRAVENOUS
OUTPATIENT

## 2025-03-13 RX ORDER — DIHYDROERGOTAMINE MESYLATE 4 MG/ML
1 SPRAY IN EACH NOSTRIL MAY REPEAT DOSE AFTER 1 HOUR NO MORE THAN 3 DOSES PER WEEK AS NEEDED SPRAY, METERED NASAL ONCE A DAY
Status: ON HOLD | COMMUNITY

## 2025-03-13 RX ORDER — ACETAMINOPHEN 650 MG
2 TABLETS AS NEEDED TABLET, EXTENDED RELEASE ORAL
Status: ACTIVE | COMMUNITY
Start: 2025-02-21

## 2025-03-13 RX ORDER — HYDROCORTISONE SODIUM SUCCINATE 100 MG/2ML
AS DIRECTED INJECTION, POWDER, FOR SOLUTION INTRAMUSCULAR; INTRAVENOUS
Status: ACTIVE | COMMUNITY
Start: 2025-02-21

## 2025-03-13 RX ORDER — HYDROCORTISONE SODIUM SUCCINATE 100 MG/2ML
AS DIRECTED INJECTION, POWDER, FOR SOLUTION INTRAMUSCULAR; INTRAVENOUS
OUTPATIENT

## 2025-03-13 RX ORDER — DIPHENHYDRAMINE HCL 2 %
1 CAPSULE AT BEDTIME AS NEEDED CREAM (GRAM) TOPICAL ONCE A DAY
Qty: 30 | OUTPATIENT

## 2025-03-13 RX ORDER — TOPIRAMATE 50 MG/1
1 TABLET TABLET, FILM COATED ORAL ONCE A DAY
Status: ON HOLD | COMMUNITY

## 2025-03-13 RX ORDER — VEDOLIZUMAB 300 MG/5ML
AS DIRECTED INJECTION, POWDER, LYOPHILIZED, FOR SOLUTION INTRAVENOUS
Status: ACTIVE | COMMUNITY
Start: 2025-02-21

## 2025-03-13 RX ORDER — PANTOPRAZOLE SODIUM 40 MG/1
1 TABLET TABLET, DELAYED RELEASE ORAL ONCE A DAY
Qty: 90 TABLET | Refills: 1 | Status: ACTIVE | COMMUNITY
Start: 2024-04-08

## 2025-03-13 NOTE — HPI-TODAY'S VISIT:
The patient is 45-year-old male with past medical history as noted below known to Dr. Rivera is presenting to our clinic today to discuss labs and EGD results. He continues on Pantoprazole 40 mg once daily and Dicyclomine 20 mg one tablet every 8 hours as needed. He was started on Entyvio 300 mg IV X 1 at week 0, 2, 6, and then every 8 weeks. He is scheduled for Entyvio infusion on 03/17/2025, 04/02/2025, and 04/30/2025. He denies any changes in his bowel habits. No abdomianl pain but abdominal discomfort. No nausea or vomiting. No myalgia or arthralgia.   Diagnostic studies: -- Stool study on 03/0/2025 with WBC undetectable.  Fecal calprotectin 238.2 mg/kg.  -- Labs on 02/26/2025 CRP 2.  QuantiFERON TB negative.  CBC with WBC 6.9, hemoglobin 14.3, hematocrit 40.0, and platelet 298.  Acute hepatitis panel nonreactive.  -- Labs on 01/15/2025 CBC with WBC 4.9, hemoglobin 14.3, hematocrit 45.9, and platelet 211.  CMP with sodium 140, potassium 3.9, BUN 24, creatinine 1.12, ALP 64, AST 32, and ALT 56. Fe panel with Fe 85, TIBC 287, % saturation 22, and Ferritin 61.  Vitamin B12 637.  Folic acid 11.4.  -- CT scan of  Heart on 01/27/2025 with small hiatal hernia with distal esophageal wall thickening. The imaging findings are nonspecific but can be seen with underlying esophagitis. Follow-up is suggested with endoscopy to exclude the possibility of a more aggressive process.  -- HIDA scan on 11/11/2024 with no scintigraphic evidence of delayed gallbladder emptying.   -- Labs on 09/25/2024 CBC with WBC 5.4, hemoglobin 14.2, hematocrit 46.4, and platelet 212.  Panel Fe with Fe 64, TIBC 449, % saturation 14, and Ferritin 14.  CMP with BUN 12, creatinine 1.39, ALP 50, AST 22, ALT 27, and TB 1.0.  -- CT scan of abdomen with IV contrast on 09/13/2024 with no acute abnormality.  Fat-containing umbilical hernia.  Liver, gallbladder, pancreas, spleen, adrenal glands, and kidneys are unremarkable.  -- RUQ US on 03/28/2024 with unremarakble results.  Procedures: -- EGD with biopsy on 02/27/2025 by Dr. Linsey Rivera noted Z-line variable, 39 cm from incisors.  Dilated.  A small amount of food in the stomach.  Gastritis, characterized by congestion and erythema.  No gross lesion entire examined duodenum.  Biopsy stomach with chemical and reactive gastropathy.  No H.pylori organism or intestinal metaplasia.  Esophagus with reflux type changes.  No Hardwick's esophagus or eosinophilic esophagitis.  -- Colonoscopy with polypectomy on 11/14/2024 by Dr. Rivera noted moderate inflammation was found in the ileum. One 9 mm polyp in the cecum, removed with a cold snare.  Resected and retrieved. Two 10-12 millimeter polyps in the sigmoid colon, removed with hot snare.  Resected and retrieved.  Internal hemorrhoids.  Biopsy tubular adenoma and tubulovillous adenoma colon polyps.  Patchy chronic ileitis with focal acute cryptitis, aphthous ulcer, and crypt distortion, suggestive of Crohn's disease.  Repeat colonoscopy in 3 years for surveillance.

## 2025-03-17 ENCOUNTER — OFFICE VISIT (OUTPATIENT)
Dept: URBAN - METROPOLITAN AREA CLINIC 79 | Facility: CLINIC | Age: 45
End: 2025-03-17
Payer: COMMERCIAL

## 2025-03-17 VITALS
DIASTOLIC BLOOD PRESSURE: 79 MMHG | HEIGHT: 69 IN | RESPIRATION RATE: 17 BRPM | WEIGHT: 210 LBS | BODY MASS INDEX: 31.1 KG/M2 | HEART RATE: 92 BPM | TEMPERATURE: 99.3 F | SYSTOLIC BLOOD PRESSURE: 131 MMHG

## 2025-03-17 DIAGNOSIS — K50.00 CROHN'S DISEASE OF ILEUM WITHOUT COMPLICATION: ICD-10-CM

## 2025-03-17 PROCEDURE — 96413 CHEMO IV INFUSION 1 HR: CPT | Performed by: INTERNAL MEDICINE

## 2025-03-17 PROCEDURE — 96375 TX/PRO/DX INJ NEW DRUG ADDON: CPT | Performed by: INTERNAL MEDICINE

## 2025-03-17 RX ORDER — TOPIRAMATE 50 MG/1
1 TABLET TABLET, FILM COATED ORAL ONCE A DAY
Status: ON HOLD | COMMUNITY

## 2025-03-17 RX ORDER — PANTOPRAZOLE SODIUM 40 MG/1
1 TABLET TABLET, DELAYED RELEASE ORAL ONCE A DAY
Qty: 90 TABLET | Refills: 1 | Status: ACTIVE | COMMUNITY
Start: 2024-04-08

## 2025-03-17 RX ORDER — DICYCLOMINE HYDROCHLORIDE 20 MG/1
1 TABLET TABLET ORAL THREE TIMES A DAY
Qty: 90 | Refills: 3 | Status: ACTIVE | COMMUNITY
Start: 2024-04-24 | End: 2025-07-11

## 2025-03-17 RX ORDER — ONDANSETRON 2 MG/ML
AS DIRECTED INJECTION, SOLUTION INTRAMUSCULAR; INTRAVENOUS
Status: ACTIVE | COMMUNITY

## 2025-03-17 RX ORDER — DICYCLOMINE HYDROCHLORIDE 20 MG/1
1 TABLET TABLET ORAL THREE TIMES A DAY
Status: ACTIVE | COMMUNITY
Start: 2025-03-13

## 2025-03-17 RX ORDER — DIPHENHYDRAMINE HCL 2 %
1 CAPSULE AT BEDTIME AS NEEDED CREAM (GRAM) TOPICAL ONCE A DAY
Qty: 30 | Status: ACTIVE | COMMUNITY

## 2025-03-17 RX ORDER — HYDROCORTISONE SODIUM SUCCINATE 100 MG/2ML
AS DIRECTED INJECTION, POWDER, FOR SOLUTION INTRAMUSCULAR; INTRAVENOUS
Status: ACTIVE | COMMUNITY

## 2025-03-17 RX ORDER — ACETAMINOPHEN 650 MG
2 TABLETS AS NEEDED TABLET, EXTENDED RELEASE ORAL
Status: ACTIVE | COMMUNITY

## 2025-03-17 RX ORDER — DIHYDROERGOTAMINE MESYLATE 4 MG/ML
1 SPRAY IN EACH NOSTRIL MAY REPEAT DOSE AFTER 1 HOUR NO MORE THAN 3 DOSES PER WEEK AS NEEDED SPRAY, METERED NASAL ONCE A DAY
Status: ON HOLD | COMMUNITY

## 2025-03-17 RX ORDER — VEDOLIZUMAB 300 MG/5ML
AS DIRECTED INJECTION, POWDER, LYOPHILIZED, FOR SOLUTION INTRAVENOUS
Status: ACTIVE | COMMUNITY

## 2025-04-01 ENCOUNTER — OFFICE VISIT (OUTPATIENT)
Dept: URBAN - METROPOLITAN AREA CLINIC 74 | Facility: CLINIC | Age: 45
End: 2025-04-01

## 2025-04-02 ENCOUNTER — OFFICE VISIT (OUTPATIENT)
Dept: URBAN - METROPOLITAN AREA CLINIC 73 | Facility: CLINIC | Age: 45
End: 2025-04-02
Payer: COMMERCIAL

## 2025-04-02 DIAGNOSIS — K50.00 CROHN'S DISEASE OF ILEUM: ICD-10-CM

## 2025-04-02 PROCEDURE — 96413 CHEMO IV INFUSION 1 HR: CPT | Performed by: INTERNAL MEDICINE

## 2025-04-02 RX ORDER — DIPHENHYDRAMINE HCL 2 %
1 CAPSULE AT BEDTIME AS NEEDED CREAM (GRAM) TOPICAL ONCE A DAY
Qty: 30 | Status: ACTIVE | COMMUNITY

## 2025-04-02 RX ORDER — DICYCLOMINE HYDROCHLORIDE 20 MG/1
1 TABLET TABLET ORAL THREE TIMES A DAY
Qty: 90 | Refills: 3 | Status: ACTIVE | COMMUNITY
Start: 2024-04-24 | End: 2025-07-11

## 2025-04-02 RX ORDER — ACETAMINOPHEN 650 MG
2 TABLETS AS NEEDED TABLET, EXTENDED RELEASE ORAL
Status: ACTIVE | COMMUNITY

## 2025-04-02 RX ORDER — TOPIRAMATE 50 MG/1
1 TABLET TABLET, FILM COATED ORAL ONCE A DAY
Status: ON HOLD | COMMUNITY

## 2025-04-02 RX ORDER — ONDANSETRON 2 MG/ML
AS DIRECTED INJECTION, SOLUTION INTRAMUSCULAR; INTRAVENOUS
Status: ACTIVE | COMMUNITY

## 2025-04-02 RX ORDER — HYDROCORTISONE SODIUM SUCCINATE 100 MG/2ML
AS DIRECTED INJECTION, POWDER, FOR SOLUTION INTRAMUSCULAR; INTRAVENOUS
Status: ACTIVE | COMMUNITY

## 2025-04-02 RX ORDER — DIHYDROERGOTAMINE MESYLATE 4 MG/ML
1 SPRAY IN EACH NOSTRIL MAY REPEAT DOSE AFTER 1 HOUR NO MORE THAN 3 DOSES PER WEEK AS NEEDED SPRAY, METERED NASAL ONCE A DAY
Status: ON HOLD | COMMUNITY

## 2025-04-02 RX ORDER — DICYCLOMINE HYDROCHLORIDE 20 MG/1
1 TABLET TABLET ORAL THREE TIMES A DAY
Status: ACTIVE | COMMUNITY
Start: 2025-03-13

## 2025-04-02 RX ORDER — PANTOPRAZOLE SODIUM 40 MG/1
1 TABLET TABLET, DELAYED RELEASE ORAL ONCE A DAY
Qty: 90 TABLET | Refills: 1 | Status: ACTIVE | COMMUNITY
Start: 2024-04-08

## 2025-04-02 RX ORDER — VEDOLIZUMAB 300 MG/5ML
AS DIRECTED INJECTION, POWDER, LYOPHILIZED, FOR SOLUTION INTRAVENOUS
Status: ACTIVE | COMMUNITY

## 2025-04-03 ENCOUNTER — OFFICE VISIT (OUTPATIENT)
Dept: URBAN - METROPOLITAN AREA CLINIC 74 | Facility: CLINIC | Age: 45
End: 2025-04-03

## 2025-04-11 ENCOUNTER — TELEPHONE ENCOUNTER (OUTPATIENT)
Dept: URBAN - METROPOLITAN AREA CLINIC 74 | Facility: CLINIC | Age: 45
End: 2025-04-11

## 2025-04-14 ENCOUNTER — OFFICE VISIT (OUTPATIENT)
Dept: URBAN - METROPOLITAN AREA CLINIC 74 | Facility: CLINIC | Age: 45
End: 2025-04-14
Payer: COMMERCIAL

## 2025-04-14 DIAGNOSIS — K50.00 CROHN'S DISEASE OF ILEUM WITHOUT COMPLICATION: ICD-10-CM

## 2025-04-14 DIAGNOSIS — K20.90 ESOPHAGITIS: ICD-10-CM

## 2025-04-14 DIAGNOSIS — K22.89 ESOPHAGEAL DILATATION: ICD-10-CM

## 2025-04-14 DIAGNOSIS — K58.1 IRRITABLE BOWEL SYNDROME WITH CONSTIPATION: ICD-10-CM

## 2025-04-14 DIAGNOSIS — K29.50 MILD CHRONIC GASTRITIS: ICD-10-CM

## 2025-04-14 PROBLEM — 440630006: Status: ACTIVE | Noted: 2025-04-14

## 2025-04-14 PROCEDURE — 99214 OFFICE O/P EST MOD 30 MIN: CPT | Performed by: PHYSICIAN ASSISTANT

## 2025-04-14 RX ORDER — DICYCLOMINE HYDROCHLORIDE 20 MG/1
1 TABLET TABLET ORAL THREE TIMES A DAY
Status: ACTIVE | COMMUNITY
Start: 2025-03-13

## 2025-04-14 RX ORDER — PANTOPRAZOLE SODIUM 40 MG/1
1 TABLET TABLET, DELAYED RELEASE ORAL ONCE A DAY
Qty: 90 TABLET | Refills: 1 | Status: ACTIVE | COMMUNITY
Start: 2024-04-08

## 2025-04-14 RX ORDER — DIPHENHYDRAMINE HCL 2 %
1 CAPSULE AT BEDTIME AS NEEDED CREAM (GRAM) TOPICAL ONCE A DAY
Qty: 30 | Status: ACTIVE | COMMUNITY

## 2025-04-14 RX ORDER — ONDANSETRON 2 MG/ML
AS DIRECTED INJECTION, SOLUTION INTRAMUSCULAR; INTRAVENOUS
Status: ACTIVE | COMMUNITY

## 2025-04-14 RX ORDER — VONOPRAZAN FUMARATE 26.72 MG/1
1 TABLET TABLET ORAL ONCE A DAY
Qty: 90 TABLET | Refills: 3 | OUTPATIENT
Start: 2025-04-14 | End: 2026-04-09

## 2025-04-14 RX ORDER — ACETAMINOPHEN 650 MG
2 TABLETS AS NEEDED TABLET, EXTENDED RELEASE ORAL
Status: ACTIVE | COMMUNITY

## 2025-04-14 RX ORDER — AMITRIPTYLINE HYDROCHLORIDE 10 MG/1
1 TABLET AT BEDTIME TABLET, FILM COATED ORAL ONCE A DAY
Qty: 90 TABLET | Refills: 3 | OUTPATIENT
Start: 2025-04-14

## 2025-04-14 RX ORDER — PANTOPRAZOLE SODIUM 40 MG/1
1 TABLET TABLET, DELAYED RELEASE ORAL ONCE A DAY
Qty: 90 TABLET | Refills: 1
Start: 2025-04-11

## 2025-04-14 RX ORDER — HYDROCORTISONE SODIUM SUCCINATE 100 MG/2ML
AS DIRECTED INJECTION, POWDER, FOR SOLUTION INTRAMUSCULAR; INTRAVENOUS
Status: ACTIVE | COMMUNITY

## 2025-04-14 RX ORDER — DICYCLOMINE HYDROCHLORIDE 20 MG/1
1 TABLET TABLET ORAL THREE TIMES A DAY
Qty: 90 | Refills: 3
Start: 2025-04-11

## 2025-04-14 RX ORDER — VEDOLIZUMAB 300 MG/5ML
AS DIRECTED INJECTION, POWDER, LYOPHILIZED, FOR SOLUTION INTRAVENOUS
Status: ACTIVE | COMMUNITY

## 2025-04-14 RX ORDER — VEDOLIZUMAB 300 MG/5ML
AS DIRECTED INJECTION, POWDER, LYOPHILIZED, FOR SOLUTION INTRAVENOUS
OUTPATIENT
Start: 2025-04-11

## 2025-04-14 NOTE — HPI-TODAY'S VISIT:
The patient is 45-year-old male with past medical history as noted below known to Dr. Rivera is presenting to our clinic today for follow up appointment. He has started on Entyvio 300 mg IV on 03/17/2025, 04/02/2025, and next dose is on 04/30/2025. He continues on Pantoprazole 40 mg once daily and Dicyclomine 20 mg one tablet every 8 hours as needed. However, he is not able to continue on Dicyclomine due to blurrd vision. He is complaining of bloating, gas, nausea, and with possible of constipation. Loss of appetite and not feeling well after meals. No rectal bleeding. No myalgia or arthralgia.   Diagnostic studies: -- Stool study on 03/04/2025 with WBC undetectable.  Fecal calprotectin 238.2 mg/kg.  -- Labs on 02/26/2025 CRP 2.  QuantiFERON TB negative.  CBC with WBC 6.9, hemoglobin 14.3, hematocrit 40.0, and platelet 298.  Acute hepatitis panel nonreactive.  -- Labs on 01/15/2025 CBC with WBC 4.9, hemoglobin 14.3, hematocrit 45.9, and platelet 211.  CMP with sodium 140, potassium 3.9, BUN 24, creatinine 1.12, ALP 64, AST 32, and ALT 56. Fe panel with Fe 85, TIBC 287, % saturation 22, and Ferritin 61.  Vitamin B12 637.  Folic acid 11.4.  -- CT scan of  Heart on 01/27/2025 with small hiatal hernia with distal esophageal wall thickening. The imaging findings are nonspecific but can be seen with underlying esophagitis. Follow-up is suggested with endoscopy to exclude the possibility of a more aggressive process.  -- HIDA scan on 11/11/2024 with no scintigraphic evidence of delayed gallbladder emptying.   -- Labs on 09/25/2024 CBC with WBC 5.4, hemoglobin 14.2, hematocrit 46.4, and platelet 212.  Panel Fe with Fe 64, TIBC 449, % saturation 14, and Ferritin 14.  CMP with BUN 12, creatinine 1.39, ALP 50, AST 22, ALT 27, and TB 1.0.  -- CT scan of abdomen with IV contrast on 09/13/2024 with no acute abnormality.  Fat-containing umbilical hernia.  Liver, gallbladder, pancreas, spleen, adrenal glands, and kidneys are unremarkable.  -- RUQ US on 03/28/2024 with unremarakble results.  Procedures: -- EGD with biopsy on 02/27/2025 by Dr. Linsey Rivera noted Z-line variable, 39 cm from incisors.  Dilated.  A small amount of food in the stomach.  Gastritis, characterized by congestion and erythema.  No gross lesion entire examined duodenum.  Biopsy stomach with chemical and reactive gastropathy.  No H.pylori organism or intestinal metaplasia.  Esophagus with reflux type changes.  No Hardwick's esophagus or eosinophilic esophagitis.  -- Colonoscopy with polypectomy on 11/14/2024 by Dr. Rivera noted moderate inflammation was found in the ileum. One 9 mm polyp in the cecum, removed with a cold snare.  Resected and retrieved. Two 10-12 millimeter polyps in the sigmoid colon, removed with hot snare.  Resected and retrieved.  Internal hemorrhoids.  Biopsy tubular adenoma and tubulovillous adenoma colon polyps.  Patchy chronic ileitis with focal acute cryptitis, aphthous ulcer, and crypt distortion, suggestive of Crohn's disease.  Repeat colonoscopy in 3 years for surveillance.

## 2025-04-22 ENCOUNTER — TELEPHONE ENCOUNTER (OUTPATIENT)
Dept: URBAN - METROPOLITAN AREA CLINIC 40 | Facility: CLINIC | Age: 45
End: 2025-04-22

## 2025-04-22 ENCOUNTER — WEB ENCOUNTER (OUTPATIENT)
Dept: URBAN - METROPOLITAN AREA CLINIC 74 | Facility: CLINIC | Age: 45
End: 2025-04-22

## 2025-04-23 PROBLEM — 724556004: Status: ACTIVE | Noted: 2025-04-23

## 2025-04-30 ENCOUNTER — OFFICE VISIT (OUTPATIENT)
Dept: URBAN - METROPOLITAN AREA CLINIC 73 | Facility: CLINIC | Age: 45
End: 2025-04-30

## 2025-04-30 RX ORDER — PANTOPRAZOLE SODIUM 40 MG/1
1 TABLET TABLET, DELAYED RELEASE ORAL ONCE A DAY
Qty: 90 TABLET | Refills: 1 | Status: ACTIVE | COMMUNITY
Start: 2025-04-11

## 2025-04-30 RX ORDER — ONDANSETRON 2 MG/ML
AS DIRECTED INJECTION, SOLUTION INTRAMUSCULAR; INTRAVENOUS
Status: ACTIVE | COMMUNITY

## 2025-04-30 RX ORDER — VEDOLIZUMAB 300 MG/5ML
AS DIRECTED INJECTION, POWDER, LYOPHILIZED, FOR SOLUTION INTRAVENOUS
Status: ACTIVE | COMMUNITY
Start: 2025-04-11

## 2025-04-30 RX ORDER — HYDROCORTISONE SODIUM SUCCINATE 100 MG/2ML
AS DIRECTED INJECTION, POWDER, FOR SOLUTION INTRAMUSCULAR; INTRAVENOUS
Status: ACTIVE | COMMUNITY

## 2025-04-30 RX ORDER — DIPHENHYDRAMINE HCL 2 %
1 CAPSULE AT BEDTIME AS NEEDED CREAM (GRAM) TOPICAL ONCE A DAY
Qty: 30 | Status: ACTIVE | COMMUNITY

## 2025-04-30 RX ORDER — AMITRIPTYLINE HYDROCHLORIDE 10 MG/1
1 TABLET AT BEDTIME TABLET, FILM COATED ORAL ONCE A DAY
Qty: 90 TABLET | Refills: 3 | Status: ACTIVE | COMMUNITY
Start: 2025-04-14

## 2025-04-30 RX ORDER — DICYCLOMINE HYDROCHLORIDE 20 MG/1
1 TABLET TABLET ORAL THREE TIMES A DAY
Qty: 90 | Refills: 3 | Status: ACTIVE | COMMUNITY
Start: 2025-04-11

## 2025-04-30 RX ORDER — DICYCLOMINE HYDROCHLORIDE 20 MG/1
1 TABLET TABLET ORAL THREE TIMES A DAY
Status: ACTIVE | COMMUNITY
Start: 2025-03-13

## 2025-04-30 RX ORDER — VONOPRAZAN FUMARATE 26.72 MG/1
1 TABLET TABLET ORAL ONCE A DAY
Qty: 90 TABLET | Refills: 3 | Status: ACTIVE | COMMUNITY
Start: 2025-04-14 | End: 2026-04-09

## 2025-04-30 RX ORDER — ACETAMINOPHEN 650 MG
2 TABLETS AS NEEDED TABLET, EXTENDED RELEASE ORAL
Status: ACTIVE | COMMUNITY

## 2025-05-07 ENCOUNTER — OFFICE VISIT (OUTPATIENT)
Dept: URBAN - METROPOLITAN AREA CLINIC 73 | Facility: CLINIC | Age: 45
End: 2025-05-07

## 2025-05-08 ENCOUNTER — LAB OUTSIDE AN ENCOUNTER (OUTPATIENT)
Dept: URBAN - METROPOLITAN AREA CLINIC 74 | Facility: CLINIC | Age: 45
End: 2025-05-08

## 2025-05-08 ENCOUNTER — OFFICE VISIT (OUTPATIENT)
Dept: URBAN - METROPOLITAN AREA CLINIC 74 | Facility: CLINIC | Age: 45
End: 2025-05-08
Payer: COMMERCIAL

## 2025-05-08 DIAGNOSIS — R10.31 RLQ ABDOMINAL PAIN: ICD-10-CM

## 2025-05-08 DIAGNOSIS — K20.90 ESOPHAGITIS: ICD-10-CM

## 2025-05-08 DIAGNOSIS — D50.0 IRON DEFICIENCY ANEMIA DUE TO CHRONIC BLOOD LOSS: ICD-10-CM

## 2025-05-08 DIAGNOSIS — K50.00 CROHN'S DISEASE OF ILEUM WITHOUT COMPLICATION: ICD-10-CM

## 2025-05-08 DIAGNOSIS — K29.50 MILD CHRONIC GASTRITIS: ICD-10-CM

## 2025-05-08 DIAGNOSIS — K22.89 ESOPHAGEAL DILATATION: ICD-10-CM

## 2025-05-08 DIAGNOSIS — K58.1 IRRITABLE BOWEL SYNDROME WITH CONSTIPATION: ICD-10-CM

## 2025-05-08 DIAGNOSIS — R10.823 RIGHT LOWER QUADRANT ABDOMINAL TENDERNESS WITH REBOUND TENDERNESS: ICD-10-CM

## 2025-05-08 PROCEDURE — 99214 OFFICE O/P EST MOD 30 MIN: CPT | Performed by: PHYSICIAN ASSISTANT

## 2025-05-08 RX ORDER — ACETAMINOPHEN 650 MG
2 TABLETS AS NEEDED TABLET, EXTENDED RELEASE ORAL
Status: ACTIVE | COMMUNITY

## 2025-05-08 RX ORDER — VONOPRAZAN FUMARATE 26.72 MG/1
1 TABLET TABLET ORAL ONCE A DAY
Qty: 90 TABLET | Refills: 3 | Status: ACTIVE | COMMUNITY
Start: 2025-04-14 | End: 2026-04-09

## 2025-05-08 RX ORDER — DICYCLOMINE HYDROCHLORIDE 20 MG/1
1 TABLET TABLET ORAL THREE TIMES A DAY
Qty: 90 | Refills: 3
Start: 2025-04-23

## 2025-05-08 RX ORDER — DICYCLOMINE HYDROCHLORIDE 20 MG/1
1 TABLET TABLET ORAL THREE TIMES A DAY
Status: ACTIVE | COMMUNITY
Start: 2025-03-13

## 2025-05-08 RX ORDER — VONOPRAZAN FUMARATE 26.72 MG/1
1 TABLET TABLET ORAL ONCE A DAY
Qty: 90 TABLET | Refills: 3 | OUTPATIENT
Start: 2025-04-23 | End: 2026-04-18

## 2025-05-08 RX ORDER — ONDANSETRON 2 MG/ML
AS DIRECTED INJECTION, SOLUTION INTRAMUSCULAR; INTRAVENOUS
Status: ACTIVE | COMMUNITY

## 2025-05-08 RX ORDER — BUDESONIDE 3 MG/1
3 CAPSULES CAPSULE ORAL ONCE A DAY
Qty: 90 CAPSULE | Refills: 3 | OUTPATIENT
Start: 2025-05-08

## 2025-05-08 RX ORDER — HYDROCORTISONE SODIUM SUCCINATE 100 MG/2ML
AS DIRECTED INJECTION, POWDER, FOR SOLUTION INTRAMUSCULAR; INTRAVENOUS
OUTPATIENT
Start: 2025-05-09

## 2025-05-08 RX ORDER — ACETAMINOPHEN 650 MG
2 TABLETS AS NEEDED TABLET, EXTENDED RELEASE ORAL
OUTPATIENT
Start: 2025-05-09

## 2025-05-08 RX ORDER — VEDOLIZUMAB 300 MG/5ML
AS DIRECTED INJECTION, POWDER, LYOPHILIZED, FOR SOLUTION INTRAVENOUS
Status: ACTIVE | COMMUNITY
Start: 2025-04-11

## 2025-05-08 RX ORDER — DICYCLOMINE HYDROCHLORIDE 20 MG/1
1 TABLET TABLET ORAL THREE TIMES A DAY
Qty: 90 | Refills: 3 | Status: ACTIVE | COMMUNITY
Start: 2025-04-11

## 2025-05-08 RX ORDER — ONDANSETRON 2 MG/ML
AS DIRECTED INJECTION, SOLUTION INTRAMUSCULAR; INTRAVENOUS
OUTPATIENT
Start: 2025-05-09

## 2025-05-08 RX ORDER — AMITRIPTYLINE HYDROCHLORIDE 10 MG/1
1 TABLET AT BEDTIME TABLET, FILM COATED ORAL ONCE A DAY
Qty: 90 TABLET | Refills: 3 | Status: ACTIVE | COMMUNITY
Start: 2025-04-14

## 2025-05-08 RX ORDER — AMITRIPTYLINE HYDROCHLORIDE 10 MG/1
1 TABLET AT BEDTIME TABLET, FILM COATED ORAL ONCE A DAY
Qty: 90 TABLET | Refills: 3 | OUTPATIENT
Start: 2025-04-23

## 2025-05-08 RX ORDER — HYDROCORTISONE SODIUM SUCCINATE 100 MG/2ML
AS DIRECTED INJECTION, POWDER, FOR SOLUTION INTRAMUSCULAR; INTRAVENOUS
Status: ACTIVE | COMMUNITY

## 2025-05-08 RX ORDER — DIPHENHYDRAMINE HCL 2 %
1 CAPSULE AT BEDTIME AS NEEDED CREAM (GRAM) TOPICAL ONCE A DAY
Qty: 30 | Status: ACTIVE | COMMUNITY

## 2025-05-08 RX ORDER — FERROUS SULFATE 325(65) MG
1 TABLET TABLET ORAL
Status: ACTIVE | COMMUNITY

## 2025-05-08 NOTE — PHYSICAL EXAM GASTROINTESTINAL
Abdomen , soft, RLQ tenderness, nondistended , no guarding or rigidity , no masses palpable , normal bowel sounds , Liver and Spleen,  no hepatosplenomegaly , liver nontender

## 2025-05-08 NOTE — HPI-TODAY'S VISIT:
The patient is 45-year-old male with past medical history as noted below known to Dr. Rivera is presenting to our clinic today for follow up appointment. He completed his indcution dose of Entyvio 300 mg IV on 03/17/2025, 04/02/2025, and 04/30/2025. He continues on Voquenza 20 mg once daily. He discontinued Pantoprazole 40 mg and he stated that Voquenza 20 mg works much better. Discontinue on Dicyclomine 20 mg one tablet every 8 hours as needed 2/2 blurrd vision.  He is taking Amitriptyline 10 mg once at bedtime. He was also noted that he has Fe deficiency anemia on recent labs by PCP. We started him on Ferrous Sulfate 325 mg once daily with recerral to [facility] for Fe infusion. He has an appointment on 07/02/025. He continues with nonspecific abdominal pain and discomfort with pain more located at the right lower quadrant.  He says the pain is constant, dull, and aching with no improvement on Entyvio. No joint pain.   Diagnostic studies: -- Labs on 04/17/2025 CMP with BUN 16, creatinine 1.08, ALP 51, AST 23, ALT 27, and TB 0.3.  CBC with WBC 7.6, hemoglobin 13.9, hematocrit 45.9, and platelet 250. Fe panel with Fe 13, TIBC 411, % saturation 3, and Ferritin 12.  -- Stool study on 03/04/2025 with WBC undetectable.  Fecal calprotectin 238.2 mg/kg.  -- Labs on 02/26/2025 CRP 2.  QuantiFERON TB negative.  CBC with WBC 6.9, hemoglobin 14.3, hematocrit 40.0, and platelet 298.  Acute hepatitis panel nonreactive.  -- Labs on 01/15/2025 CBC with WBC 4.9, hemoglobin 14.3, hematocrit 45.9, and platelet 211.  CMP with sodium 140, potassium 3.9, BUN 24, creatinine 1.12, ALP 64, AST 32, and ALT 56. Fe panel with Fe 85, TIBC 287, % saturation 22, and Ferritin 61.  Vitamin B12 637.  Folic acid 11.4.  -- CT scan of  Heart on 01/27/2025 with small hiatal hernia with distal esophageal wall thickening. The imaging findings are nonspecific but can be seen with underlying esophagitis. Follow-up is suggested with endoscopy to exclude the possibility of a more aggressive process.  -- HIDA scan on 11/11/2024 with no scintigraphic evidence of delayed gallbladder emptying.   -- Labs on 09/25/2024 CBC with WBC 5.4, hemoglobin 14.2, hematocrit 46.4, and platelet 212.  Panel Fe with Fe 64, TIBC 449, % saturation 14, and Ferritin 14.  CMP with BUN 12, creatinine 1.39, ALP 50, AST 22, ALT 27, and TB 1.0.  -- CT scan of abdomen with IV contrast on 09/13/2024 with no acute abnormality.  Fat-containing umbilical hernia.  Liver, gallbladder, pancreas, spleen, adrenal glands, and kidneys are unremarkable.  -- RUQ US on 03/28/2024 with unremarakble results.  Procedures: -- EGD with biopsy on 02/27/2025 by Dr. Linsey Rivera noted Z-line variable, 39 cm from incisors.  Dilated.  A small amount of food in the stomach.  Gastritis, characterized by congestion and erythema.  No gross lesion entire examined duodenum.  Biopsy stomach with chemical and reactive gastropathy.  No H.pylori organism or intestinal metaplasia.  Esophagus with reflux type changes.  No Hardwick's esophagus or eosinophilic esophagitis.  -- Colonoscopy with polypectomy on 11/14/2024 by Dr. Rivera noted moderate inflammation was found in the ileum. One 9 mm polyp in the cecum, removed with a cold snare.  Resected and retrieved. Two 10-12 millimeter polyps in the sigmoid colon, removed with hot snare.  Resected and retrieved.  Internal hemorrhoids.  Biopsy tubular adenoma and tubulovillous adenoma colon polyps.  Patchy chronic ileitis with focal acute cryptitis, aphthous ulcer, and crypt distortion, suggestive of Crohn's disease.  Repeat colonoscopy in 3 years for surveillance.

## 2025-05-09 ENCOUNTER — TELEPHONE ENCOUNTER (OUTPATIENT)
Dept: URBAN - METROPOLITAN AREA CLINIC 6 | Facility: CLINIC | Age: 45
End: 2025-05-09

## 2025-05-14 ENCOUNTER — WEB ENCOUNTER (OUTPATIENT)
Dept: URBAN - METROPOLITAN AREA CLINIC 74 | Facility: CLINIC | Age: 45
End: 2025-05-14

## 2025-05-30 ENCOUNTER — TELEPHONE ENCOUNTER (OUTPATIENT)
Dept: URBAN - METROPOLITAN AREA CLINIC 74 | Facility: CLINIC | Age: 45
End: 2025-05-30

## 2025-06-18 ENCOUNTER — OFFICE VISIT (OUTPATIENT)
Dept: URBAN - METROPOLITAN AREA CLINIC 74 | Facility: CLINIC | Age: 45
End: 2025-06-18

## 2025-06-18 RX ORDER — BUDESONIDE 3 MG/1
3 CAPSULES CAPSULE ORAL ONCE A DAY
Qty: 90 CAPSULE | Refills: 3 | Status: ACTIVE | COMMUNITY
Start: 2025-05-08

## 2025-06-18 RX ORDER — DICYCLOMINE HYDROCHLORIDE 20 MG/1
1 TABLET TABLET ORAL THREE TIMES A DAY
Status: ACTIVE | COMMUNITY
Start: 2025-03-13

## 2025-06-18 RX ORDER — HYDROCORTISONE SODIUM SUCCINATE 100 MG/2ML
AS DIRECTED INJECTION, POWDER, FOR SOLUTION INTRAMUSCULAR; INTRAVENOUS
Status: ACTIVE | COMMUNITY

## 2025-06-18 RX ORDER — DICYCLOMINE HYDROCHLORIDE 20 MG/1
1 TABLET TABLET ORAL THREE TIMES A DAY
Qty: 90 | Refills: 3
Start: 2025-05-30

## 2025-06-18 RX ORDER — ACETAMINOPHEN 650 MG
2 TABLETS AS NEEDED TABLET, EXTENDED RELEASE ORAL
OUTPATIENT
Start: 2025-05-30

## 2025-06-18 RX ORDER — AMITRIPTYLINE HYDROCHLORIDE 10 MG/1
1 TABLET AT BEDTIME TABLET, FILM COATED ORAL ONCE A DAY
Qty: 90 TABLET | Refills: 3 | OUTPATIENT
Start: 2025-05-30

## 2025-06-18 RX ORDER — VONOPRAZAN FUMARATE 26.72 MG/1
1 TABLET TABLET ORAL ONCE A DAY
Qty: 90 TABLET | Refills: 3 | OUTPATIENT
Start: 2025-05-30 | End: 2026-05-25

## 2025-06-18 RX ORDER — BUDESONIDE 3 MG/1
3 CAPSULES CAPSULE ORAL ONCE A DAY
Qty: 90 CAPSULE | Refills: 3 | OUTPATIENT
Start: 2025-05-30

## 2025-06-18 RX ORDER — FERROUS SULFATE 325(65) MG
1 TABLET TABLET ORAL
Status: ACTIVE | COMMUNITY

## 2025-06-18 RX ORDER — DIPHENHYDRAMINE HCL 2 %
1 CAPSULE AT BEDTIME AS NEEDED CREAM (GRAM) TOPICAL ONCE A DAY
Qty: 30 | Status: ACTIVE | COMMUNITY

## 2025-06-18 RX ORDER — ACETAMINOPHEN 650 MG
2 TABLETS AS NEEDED TABLET, EXTENDED RELEASE ORAL
Status: ACTIVE | COMMUNITY
Start: 2025-05-09

## 2025-06-18 RX ORDER — VONOPRAZAN FUMARATE 26.72 MG/1
1 TABLET TABLET ORAL ONCE A DAY
Qty: 90 TABLET | Refills: 3 | Status: ACTIVE | COMMUNITY
Start: 2025-04-23 | End: 2026-04-18

## 2025-06-18 RX ORDER — HYDROCORTISONE SODIUM SUCCINATE 100 MG/2ML
AS DIRECTED INJECTION, POWDER, FOR SOLUTION INTRAMUSCULAR; INTRAVENOUS
OUTPATIENT
Start: 2025-05-30

## 2025-06-18 RX ORDER — HYDROCORTISONE SODIUM SUCCINATE 100 MG/2ML
AS DIRECTED INJECTION, POWDER, FOR SOLUTION INTRAMUSCULAR; INTRAVENOUS
Status: ACTIVE | COMMUNITY
Start: 2025-05-09

## 2025-06-18 RX ORDER — ONDANSETRON 2 MG/ML
AS DIRECTED INJECTION, SOLUTION INTRAMUSCULAR; INTRAVENOUS
OUTPATIENT
Start: 2025-05-30

## 2025-06-18 RX ORDER — ACETAMINOPHEN 650 MG
2 TABLETS AS NEEDED TABLET, EXTENDED RELEASE ORAL
Status: ACTIVE | COMMUNITY

## 2025-06-18 RX ORDER — ONDANSETRON 2 MG/ML
AS DIRECTED INJECTION, SOLUTION INTRAMUSCULAR; INTRAVENOUS
Status: ACTIVE | COMMUNITY
Start: 2025-05-09

## 2025-06-18 RX ORDER — ONDANSETRON 2 MG/ML
AS DIRECTED INJECTION, SOLUTION INTRAMUSCULAR; INTRAVENOUS
Status: ACTIVE | COMMUNITY

## 2025-06-18 RX ORDER — AMITRIPTYLINE HYDROCHLORIDE 10 MG/1
1 TABLET AT BEDTIME TABLET, FILM COATED ORAL ONCE A DAY
Qty: 90 TABLET | Refills: 3 | Status: ACTIVE | COMMUNITY
Start: 2025-04-23

## 2025-06-18 RX ORDER — VEDOLIZUMAB 300 MG/5ML
AS DIRECTED INJECTION, POWDER, LYOPHILIZED, FOR SOLUTION INTRAVENOUS
Status: ACTIVE | COMMUNITY
Start: 2025-04-11

## 2025-06-18 RX ORDER — DICYCLOMINE HYDROCHLORIDE 20 MG/1
1 TABLET TABLET ORAL THREE TIMES A DAY
Qty: 90 | Refills: 3 | Status: ACTIVE | COMMUNITY
Start: 2025-04-23

## 2025-06-18 NOTE — HPI-TODAY'S VISIT:
The patient is 45-year-old male with past medical history as noted below known to Dr. Rivera is presenting to our clinic today for follow up appointment. He continues on Entyvio 300 mg IV, Voquenza 20 mg once daily, Amitriptyline 10 mg once at bedtime, Ferrous Sulfate 325 mg once daily with recerral to [facility] for Fe infusion, and Entocort EC 3 mg three tablets once daily for 8-12 weeks.  Diagnostic studies: -- CTe on 05/29/2025 no evidence of abnormality. CT enterography shows no evidence of small bowel inflammation or mass.Liver, gallbladder, pancreas, spleen, and adrenal glands are unremarkable.  A 1 mm punctated calculus in the left kidney.  A 1.8 cm fat-containing umbilical hernia.  -- Labs on 04/17/2025 CMP with BUN 16, creatinine 1.08, ALP 51, AST 23, ALT 27, and TB 0.3.  CBC with WBC 7.6, hemoglobin 13.9, hematocrit 45.9, and platelet 250. Fe panel with Fe 13, TIBC 411, % saturation 3, and Ferritin 12.  -- Stool study on 03/04/2025 with WBC undetectable.  Fecal calprotectin 238.2 mg/kg.  -- Labs on 02/26/2025 CRP 2.  QuantiFERON TB negative.  CBC with WBC 6.9, hemoglobin 14.3, hematocrit 40.0, and platelet 298.  Acute hepatitis panel nonreactive.  Procedures: -- EGD with biopsy on 02/27/2025 by Dr. Linsey Rivera noted Z-line variable, 39 cm from incisors.  Dilated.  A small amount of food in the stomach.  Gastritis, characterized by congestion and erythema.  No gross lesion entire examined duodenum.  Biopsy stomach with chemical and reactive gastropathy.  No H.pylori organism or intestinal metaplasia.  Esophagus with reflux type changes.  No Hardwick's esophagus or eosinophilic esophagitis.  -- Colonoscopy with polypectomy on 11/14/2024 by Dr. Rivera noted moderate inflammation was found in the ileum. One 9 mm polyp in the cecum, removed with a cold snare.  Resected and retrieved. Two 10-12 millimeter polyps in the sigmoid colon, removed with hot snare.  Resected and retrieved.  Internal hemorrhoids.  Biopsy tubular adenoma and tubulovillous adenoma colon polyps.  Patchy chronic ileitis with focal acute cryptitis, aphthous ulcer, and crypt distortion, suggestive of Crohn's disease.  Repeat colonoscopy in 3 years for surveillance.

## 2025-06-25 ENCOUNTER — OFFICE VISIT (OUTPATIENT)
Dept: URBAN - METROPOLITAN AREA CLINIC 73 | Facility: CLINIC | Age: 45
End: 2025-06-25

## 2025-07-09 ENCOUNTER — OFFICE VISIT (OUTPATIENT)
Dept: URBAN - METROPOLITAN AREA CLINIC 73 | Facility: CLINIC | Age: 45
End: 2025-07-09
Payer: COMMERCIAL

## 2025-07-09 DIAGNOSIS — K50.00 CROHN'S DISEASE OF ILEUM WITHOUT COMPLICATION: ICD-10-CM

## 2025-07-09 PROCEDURE — 96413 CHEMO IV INFUSION 1 HR: CPT | Performed by: INTERNAL MEDICINE

## 2025-07-09 RX ORDER — FERROUS SULFATE 325(65) MG
1 TABLET TABLET ORAL
Status: ACTIVE | COMMUNITY

## 2025-07-09 RX ORDER — HYDROCORTISONE SODIUM SUCCINATE 100 MG/2ML
AS DIRECTED INJECTION, POWDER, FOR SOLUTION INTRAMUSCULAR; INTRAVENOUS
Status: ACTIVE | COMMUNITY
Start: 2025-05-30

## 2025-07-09 RX ORDER — DICYCLOMINE HYDROCHLORIDE 20 MG/1
1 TABLET TABLET ORAL THREE TIMES A DAY
Qty: 90 | Refills: 3 | Status: ACTIVE | COMMUNITY
Start: 2025-05-30

## 2025-07-09 RX ORDER — VONOPRAZAN FUMARATE 26.72 MG/1
1 TABLET TABLET ORAL ONCE A DAY
Qty: 90 TABLET | Refills: 3 | Status: ACTIVE | COMMUNITY
Start: 2025-05-30 | End: 2026-05-25

## 2025-07-09 RX ORDER — HYDROCORTISONE SODIUM SUCCINATE 100 MG/2ML
AS DIRECTED INJECTION, POWDER, FOR SOLUTION INTRAMUSCULAR; INTRAVENOUS
Status: ACTIVE | COMMUNITY

## 2025-07-09 RX ORDER — AMITRIPTYLINE HYDROCHLORIDE 10 MG/1
1 TABLET AT BEDTIME TABLET, FILM COATED ORAL ONCE A DAY
Qty: 90 TABLET | Refills: 3 | Status: ACTIVE | COMMUNITY
Start: 2025-05-30

## 2025-07-09 RX ORDER — ONDANSETRON 2 MG/ML
AS DIRECTED INJECTION, SOLUTION INTRAMUSCULAR; INTRAVENOUS
Status: ACTIVE | COMMUNITY

## 2025-07-09 RX ORDER — VEDOLIZUMAB 300 MG/5ML
AS DIRECTED INJECTION, POWDER, LYOPHILIZED, FOR SOLUTION INTRAVENOUS
Status: ACTIVE | COMMUNITY
Start: 2025-04-11

## 2025-07-09 RX ORDER — ONDANSETRON 2 MG/ML
AS DIRECTED INJECTION, SOLUTION INTRAMUSCULAR; INTRAVENOUS
Status: ACTIVE | COMMUNITY
Start: 2025-05-30

## 2025-07-09 RX ORDER — DICYCLOMINE HYDROCHLORIDE 20 MG/1
1 TABLET TABLET ORAL THREE TIMES A DAY
Status: ACTIVE | COMMUNITY
Start: 2025-03-13

## 2025-07-09 RX ORDER — DIPHENHYDRAMINE HCL 2 %
1 CAPSULE AT BEDTIME AS NEEDED CREAM (GRAM) TOPICAL ONCE A DAY
Qty: 30 | Status: ACTIVE | COMMUNITY

## 2025-07-09 RX ORDER — ACETAMINOPHEN 650 MG
2 TABLETS AS NEEDED TABLET, EXTENDED RELEASE ORAL
Status: ACTIVE | COMMUNITY
Start: 2025-05-30

## 2025-07-09 RX ORDER — ACETAMINOPHEN 650 MG
2 TABLETS AS NEEDED TABLET, EXTENDED RELEASE ORAL
Status: ACTIVE | COMMUNITY

## 2025-07-09 RX ORDER — BUDESONIDE 3 MG/1
3 CAPSULES CAPSULE ORAL ONCE A DAY
Qty: 90 CAPSULE | Refills: 3 | Status: ACTIVE | COMMUNITY
Start: 2025-05-30

## 2025-07-14 ENCOUNTER — OFFICE VISIT (OUTPATIENT)
Dept: URBAN - METROPOLITAN AREA CLINIC 79 | Facility: CLINIC | Age: 45
End: 2025-07-14